# Patient Record
Sex: MALE | URBAN - METROPOLITAN AREA
[De-identification: names, ages, dates, MRNs, and addresses within clinical notes are randomized per-mention and may not be internally consistent; named-entity substitution may affect disease eponyms.]

---

## 2017-10-13 NOTE — TELEPHONE ENCOUNTER
APPT INFORMATION    Date & Time: 10/18/17, 11AM   Reason for Appt: MS   Referring Name/Clinic: self    Yes / No COMMENT / NOTES DATE & ACTION   Patient Contacted? No Records in epic.         RECORDS CLINIC NAME  (use N/A if no records ) DATE & ACTION RECEIVED RECS & IMG? Y/N   (may include other helpful notes)   External Clinics:            Internal Clinics: [facility]

## 2017-10-16 NOTE — PROGRESS NOTES
The patient 1st developed blurring of vision in his left eye. Then in 2011 the patient's vision got progressively worse. His visual symptoms were not painful.He was seen by ophthalmology.he was felt to have optic neuritis. Therefore, he underwent an MRI that demonstrated more than 8 lesions in both his brain and spine. LP reportedly demonstrated is consistent with a diagnosis of multiple sclerosis. He was given a diagnosis of MS 2011.he was started on Rebif which he was on until 2013. [employer] [place] of having to discontinue rebus  Because of flulike symptoms and new lesions on MRI. He was seen by Dr. Starks in July 2015. He had a MRI of the of the neuraxis after that visit.. E had 15 areas of increased T2 signal from his brain without any enhancement. His cervical MRI demonstrated to T2 lesions. He received 3 days of high-dose oral prednisone which he tolerated well. After this he was started on Copaxone.    Replete.  Monitor lytes

## 2017-10-18 ENCOUNTER — OFFICE VISIT (OUTPATIENT)
Dept: NEUROLOGY | Facility: CLINIC | Age: 37
End: 2017-10-18
Attending: PSYCHIATRY & NEUROLOGY
Payer: COMMERCIAL

## 2017-10-18 ENCOUNTER — PRE VISIT (OUTPATIENT)
Dept: NEUROLOGY | Facility: CLINIC | Age: 37
End: 2017-10-18

## 2017-10-18 VITALS — DIASTOLIC BLOOD PRESSURE: 84 MMHG | HEIGHT: 70 IN | SYSTOLIC BLOOD PRESSURE: 129 MMHG | HEART RATE: 99 BPM

## 2017-10-18 DIAGNOSIS — G35 MS (MULTIPLE SCLEROSIS) (H): Primary | ICD-10-CM

## 2017-10-18 LAB
ALBUMIN SERPL-MCNC: 4.2 G/DL (ref 3.4–5)
ALP SERPL-CCNC: 40 U/L (ref 40–150)
ALT SERPL W P-5'-P-CCNC: 23 U/L (ref 0–70)
ANION GAP SERPL CALCULATED.3IONS-SCNC: 8 MMOL/L (ref 3–14)
AST SERPL W P-5'-P-CCNC: 11 U/L (ref 0–45)
B BURGDOR IGG+IGM SER QL: 0.05 (ref 0–0.89)
BASOPHILS # BLD AUTO: 0.1 10E9/L (ref 0–0.2)
BASOPHILS NFR BLD AUTO: 0.8 %
BILIRUB SERPL-MCNC: 0.2 MG/DL (ref 0.2–1.3)
BUN SERPL-MCNC: 7 MG/DL (ref 7–30)
CALCIUM SERPL-MCNC: 8.6 MG/DL (ref 8.5–10.1)
CHLORIDE SERPL-SCNC: 106 MMOL/L (ref 94–109)
CO2 SERPL-SCNC: 25 MMOL/L (ref 20–32)
CREAT SERPL-MCNC: 0.7 MG/DL (ref 0.66–1.25)
DEPRECATED CALCIDIOL+CALCIFEROL SERPL-MC: 32 UG/L (ref 20–75)
DIFFERENTIAL METHOD BLD: NORMAL
EOSINOPHIL # BLD AUTO: 0.3 10E9/L (ref 0–0.7)
EOSINOPHIL NFR BLD AUTO: 3.9 %
ERYTHROCYTE [DISTWIDTH] IN BLOOD BY AUTOMATED COUNT: 12.5 % (ref 10–15)
FOLATE SERPL-MCNC: 33.6 NG/ML
GFR SERPL CREATININE-BSD FRML MDRD: >90 ML/MIN/1.7M2
GLUCOSE SERPL-MCNC: 112 MG/DL (ref 70–99)
HBV CORE AB SERPL QL IA: NONREACTIVE
HBV SURFACE AB SERPL IA-ACNC: 285.89 M[IU]/ML
HBV SURFACE AG SERPL QL IA: NONREACTIVE
HCT VFR BLD AUTO: 43.7 % (ref 40–53)
HGB BLD-MCNC: 14.8 G/DL (ref 13.3–17.7)
IMM GRANULOCYTES # BLD: 0 10E9/L (ref 0–0.4)
IMM GRANULOCYTES NFR BLD: 0.4 %
LYMPHOCYTES # BLD AUTO: 2.9 10E9/L (ref 0.8–5.3)
LYMPHOCYTES NFR BLD AUTO: 39.7 %
MCH RBC QN AUTO: 30 PG (ref 26.5–33)
MCHC RBC AUTO-ENTMCNC: 33.9 G/DL (ref 31.5–36.5)
MCV RBC AUTO: 89 FL (ref 78–100)
MONOCYTES # BLD AUTO: 0.6 10E9/L (ref 0–1.3)
MONOCYTES NFR BLD AUTO: 8.2 %
NEUTROPHILS # BLD AUTO: 3.4 10E9/L (ref 1.6–8.3)
NEUTROPHILS NFR BLD AUTO: 47 %
NRBC # BLD AUTO: 0 10*3/UL
NRBC BLD AUTO-RTO: 0 /100
PLATELET # BLD AUTO: 260 10E9/L (ref 150–450)
POTASSIUM SERPL-SCNC: 3.7 MMOL/L (ref 3.4–5.3)
PROT SERPL-MCNC: 7.7 G/DL (ref 6.8–8.8)
RBC # BLD AUTO: 4.94 10E12/L (ref 4.4–5.9)
SODIUM SERPL-SCNC: 139 MMOL/L (ref 133–144)
TSH SERPL DL<=0.005 MIU/L-ACNC: 0.51 MU/L (ref 0.4–4)
VIT B12 SERPL-MCNC: 517 PG/ML (ref 193–986)
WBC # BLD AUTO: 7.2 10E9/L (ref 4–11)

## 2017-10-18 PROCEDURE — 80053 COMPREHEN METABOLIC PANEL: CPT | Performed by: PSYCHIATRY & NEUROLOGY

## 2017-10-18 PROCEDURE — 86706 HEP B SURFACE ANTIBODY: CPT | Performed by: PSYCHIATRY & NEUROLOGY

## 2017-10-18 PROCEDURE — 82607 VITAMIN B-12: CPT | Performed by: PSYCHIATRY & NEUROLOGY

## 2017-10-18 PROCEDURE — 86704 HEP B CORE ANTIBODY TOTAL: CPT | Performed by: PSYCHIATRY & NEUROLOGY

## 2017-10-18 PROCEDURE — 84207 ASSAY OF VITAMIN B-6: CPT | Performed by: PSYCHIATRY & NEUROLOGY

## 2017-10-18 PROCEDURE — 86618 LYME DISEASE ANTIBODY: CPT | Performed by: PSYCHIATRY & NEUROLOGY

## 2017-10-18 PROCEDURE — 86787 VARICELLA-ZOSTER ANTIBODY: CPT | Performed by: PSYCHIATRY & NEUROLOGY

## 2017-10-18 PROCEDURE — 83516 IMMUNOASSAY NONANTIBODY: CPT | Performed by: PSYCHIATRY & NEUROLOGY

## 2017-10-18 PROCEDURE — 83876 ASSAY MYELOPEROXIDASE: CPT | Performed by: PSYCHIATRY & NEUROLOGY

## 2017-10-18 PROCEDURE — 36415 COLL VENOUS BLD VENIPUNCTURE: CPT | Performed by: PSYCHIATRY & NEUROLOGY

## 2017-10-18 PROCEDURE — 84443 ASSAY THYROID STIM HORMONE: CPT | Performed by: PSYCHIATRY & NEUROLOGY

## 2017-10-18 PROCEDURE — 82306 VITAMIN D 25 HYDROXY: CPT | Performed by: PSYCHIATRY & NEUROLOGY

## 2017-10-18 PROCEDURE — 99211 OFF/OP EST MAY X REQ PHY/QHP: CPT | Mod: ZF

## 2017-10-18 PROCEDURE — 87340 HEPATITIS B SURFACE AG IA: CPT | Performed by: PSYCHIATRY & NEUROLOGY

## 2017-10-18 PROCEDURE — 87522 HEPATITIS C REVRS TRNSCRPJ: CPT | Performed by: PSYCHIATRY & NEUROLOGY

## 2017-10-18 PROCEDURE — 40000975 ZZHCL STATISTIC JC VIR AB INDEX INHIB: Performed by: PSYCHIATRY & NEUROLOGY

## 2017-10-18 PROCEDURE — 86255 FLUORESCENT ANTIBODY SCREEN: CPT | Performed by: PSYCHIATRY & NEUROLOGY

## 2017-10-18 PROCEDURE — 85025 COMPLETE CBC W/AUTO DIFF WBC: CPT | Performed by: PSYCHIATRY & NEUROLOGY

## 2017-10-18 PROCEDURE — 86480 TB TEST CELL IMMUN MEASURE: CPT | Performed by: PSYCHIATRY & NEUROLOGY

## 2017-10-18 PROCEDURE — 86431 RHEUMATOID FACTOR QUANT: CPT | Performed by: PSYCHIATRY & NEUROLOGY

## 2017-10-18 PROCEDURE — 82746 ASSAY OF FOLIC ACID SERUM: CPT | Performed by: PSYCHIATRY & NEUROLOGY

## 2017-10-18 ASSESSMENT — PAIN SCALES - GENERAL: PAINLEVEL: NO PAIN (0)

## 2017-10-18 NOTE — MR AVS SNAPSHOT
After Visit Summary   10/18/2017    Mychal Stafford    MRN: [id number]           Patient Information     Date Of Birth          1980        Visit Information        Provider Department      10/18/2017 11:00 AM Montez Villafana MD [facility] Multiple Sclerosis        Today's Diagnoses     MS (multiple sclerosis) (H)    -  1      Care Instructions    Will get MRI brain and cervical spine    Will get blood work    Will follow up in 4 weeks     Please call or send a [employer] message with any questions or concerns.    You saw Dr Montez Villafana, today at the [facility] in [place].  In order to get a message to Dr. Villafana, please call [phone].  Please call if you have any of the following symptoms:    New or worsening neurologic symptoms that persist for 24-48 hours, such as:  o New onset of pain or marked worsening of pain  o Difficulty with speech, swallowing, or breathing  o New onset of vertigo or dizziness  o Change in bowel or bladder function (incontinence, difficulty urinating)    Increasing difficulty in self care    Marked changes in vision (double vision, blurred vision, graying of vision)    Change in mobility    Change in cognitive function    Falling    Worsening numbness, tingling or pain with a change in function    Worsening fatigue lasting more than 2 weeks  If you had labs completed today, we will contact you with the results.  If you are active in [employer], they will be released to you there.  Otherwise, your results will be provided to you via mail or telephone call.  Some results take up to 2 weeks for completion.  If you haven t heard anything about your lab results within 2 weeks, please call [phone] or send a [employer] message to obtain your results.  If you have an MRI scheduled prior to your next appointment with Dr. Villafana, he will provide you with the results at your scheduled follow up appointment.  If you are not scheduled to  see Dr. Villafana within 2 weeks after your MRI, please call or send a [employer] message to obtain your results.  Please be aware that it takes at least 5 business days after routine MRIs for your results to be reviewed by both the radiologist and Dr. Villafana.  Prescription refills should be faxed to us by your pharmacy.  Our fax number for prescription refills is [phone].  Please do your best to come to your appointments, and to arrive 10 minutes early to allow time for checking in.  [facility] reserves the right to terminate care of established patients if a patient misses three or more appointments in a clinic without providing notification within a 12-month period.    Developing Your Care Team  Individuals living with chronic illnesses like MS may be unaware that they are at risk for the same range of medical problems as everyone else.  This is why you must establish a relationship with other health care providers in addition to your Multiple Sclerosis doctor.  It can be difficult at times to figure out whether a health concern is related to your MS, or whether it is related to something else, such as hormonal changes, pseduoexacerbations, changes in your core body temperature, flu-like reactions to interferons, exercise, or infections.  Urinary tract infections are common culprits that can cause fatigue, weakness, or other  MS attack -like symptoms without classic symptoms of a UTI.  For this reason, if you call or come in to discuss symptoms, you may be asked to get in touch with your primary provider or another specialist, so that you receive the comprehensive care you need.  What is Multiple Sclerosis (MS)?  MS is a disease in which the nerve tissues in the brain and/or spinal cord are attacked by immune cells in the body.  These immune cells are present in everyone, and their normal role is to fight off infections.  In people with MS, these cells change the way they function and cross  into the nervous system.  Once there, they cause inflammation that damages the myelin (or the protective coating of a nerve cell, much like the plastic covering on an electrical cord) and parts of the nerve cell itself.  So far, a clear cause for this immune system dysfunction has not been found.  MS often starts out as the  relapsing-remitting  form.  This means there are episodes when you have symptoms, and other times when you recover to normal or near-normal.  Over time, if the damage to the nervous system continues, the disease can cause additional disability, such as difficulty walking.  If the relapses and nerve damage can be prevented with available medications, many patients with MS can go many years between relapses and have relatively little disability.  Remember: MS is a condition that changes and must be evaluated on an ongoing basis!  What is a Relapse? (Also called flare-ups, attacks, or exacerbations)  Relapses are due to the occurrence of inflammation in some part of the brain and spinal cord.  A relapse is new or recurrent symptoms which persist for at least 24 hours and sometimes worsen over 48 hours.  New symptoms need to be  by at least 1 month in order to be considered separate relapses. Most of the time, symptoms reach their maximal intensity within 2 weeks and then begin to slowly resolve.  At times, your symptoms may not recover fully for up to 6 months, depending on the severity of the episode.  The frequency of relapses is generally higher early in the disease, but can vary greatly among individuals with MS.  Improvement of symptoms for an individual is unpredictable with each relapse.    It is important to remember than an increase in symptoms and changes in function may not necessarily be a relapse.  There are other factors that contribute to such changes, such as hot weather, increased body temperature, infection, and stress.  The worsening of symptoms may look like a relapse  when in reality it is not.  These episodes are referred to as pseudorelapses.  Once the underlying cause is addressed, symptoms usually fade away and you feel better.  If you experience a worsening of symptoms that lasts more than 48 hours and does not improve with cooling down, decreasing stress, or treatment of an infection, please call us and we can help to better determine whether you are having a pseudorelapse versus a relapse.              Follow-ups after your visit        Follow-up notes from your care team     Return in about 4 weeks (around 11/15/2017).      Your next 10 appointments already scheduled     Oct 18, 2017 12:00 PM CDT   Lab with  LAB   [facility] ([facility])    [address] [place] [id number]   [phone]            Nov 21, 2017  4:45 PM CST   (Arrive by 4:30 PM)   MR BRAIN W/O & W CONTRAST with [address] MRI ([facility])    [address] [place] [id number] [phone]           Take your medicines as usual, unless your doctor tells you not to. Bring a list of your current medicines to your exam (including vitamins, minerals and over-the-counter drugs).  You will be given intravenous contrast for this exam. To prepare:   The day before your exam, drink extra fluids at least six 8-ounce glasses (unless your doctor tells you to restrict your fluids).   Have a blood test (creatinine test) within 30 days of your exam. Go to your clinic or Diagnostic Imaging Department for this test.  The MRI machine uses a strong magnet. Please wear clothes without metal (snaps, zippers). A sweatsuit works well, or we may give you a hospital gown.  Please remove any body piercings and hair extensions before you arrive. You will also remove watches, jewelry, hairpins, wallets, dentures, partial dental plates and hearing aids. You may wear contact lenses, and you may be able to wear  your rings. We have a safe place to keep your personal items, but it is safer to leave them at home.   **IMPORTANT** THE INSTRUCTIONS BELOW ARE ONLY FOR THOSE PATIENTS WHO HAVE BEEN TOLD THEY WILL RECEIVE SEDATION OR GENERAL ANESTHESIA DURING THEIR MRI PROCEDURE:  IF YOU WILL RECEIVE SEDATION (take medicine to help you relax during your exam):   You must get the medicine from your doctor before you arrive. Bring the medicine to the exam. Do not take it at home.   Arrive one hour early. Bring someone who can take you home after the test. Your medicine will make you sleepy. After the exam, you may not drive, take a bus or take a taxi by yourself.   No eating 8 hours before your exam. You may have clear liquids up until 4 hours before your exam. (Clear liquids include water, clear tea, black coffee and fruit juice without pulp.)  IF YOU WILL RECEIVE ANESTHESIA (be asleep for your exam):   Arrive 1 1/2 hours early. Bring someone who can take you home after the test. You may not drive, take a bus or take a taxi by yourself.   No eating 8 hours before your exam. You may have clear liquids up until 4 hours before your exam. (Clear liquids include water, clear tea, black coffee and fruit juice without pulp.)  Please call the Imaging Department at your exam site with any questions.            Nov 21, 2017  5:30 PM CST   (Arrive by 5:15 PM)   MR CERVICAL SPINE W/O & W CONTRAST with [address] [facility] ([facility])    [address] [id number] [phone]           Take your medicines as usual, unless your doctor tells you not to. Bring a list of your current medicines to your exam (including vitamins, minerals and over-the-counter drugs).  You will be given intravenous contrast for this exam. To prepare:   The day before your exam, drink extra fluids at least six 8-ounce glasses (unless your doctor tells you to restrict your fluids).   Have a blood  test (creatinine test) within 30 days of your exam. Go to your clinic or Diagnostic Imaging Department for this test.  The MRI machine uses a strong magnet. Please wear clothes without metal (snaps, zippers). A sweatsuit works well, or we may give you a hospital gown.  Please remove any body piercings and hair extensions before you arrive. You will also remove watches, jewelry, hairpins, wallets, dentures, partial dental plates and hearing aids. You may wear contact lenses, and you may be able to wear your rings. We have a safe place to keep your personal items, but it is safer to leave them at home.   **IMPORTANT** THE INSTRUCTIONS BELOW ARE ONLY FOR THOSE PATIENTS WHO HAVE BEEN TOLD THEY WILL RECEIVE SEDATION OR GENERAL ANESTHESIA DURING THEIR MRI PROCEDURE:  IF YOU WILL RECEIVE SEDATION (take medicine to help you relax during your exam):   You must get the medicine from your doctor before you arrive. Bring the medicine to the exam. Do not take it at home.   Arrive one hour early. Bring someone who can take you home after the test. Your medicine will make you sleepy. After the exam, you may not drive, take a bus or take a taxi by yourself.   No eating 8 hours before your exam. You may have clear liquids up until 4 hours before your exam. (Clear liquids include water, clear tea, black coffee and fruit juice without pulp.)  IF YOU WILL RECEIVE ANESTHESIA (be asleep for your exam):   Arrive 1 1/2 hours early. Bring someone who can take you home after the test. You may not drive, take a bus or take a taxi by yourself.   No eating 8 hours before your exam. You may have clear liquids up until 4 hours before your exam. (Clear liquids include water, clear tea, black coffee and fruit juice without pulp.)  Please call the Imaging Department at your exam site with any questions.            Nov 21, 2017  6:30 PM CST   (Arrive by 6:15 PM)   Return Visit with Montez Villafana MD   [facility] Multiple Sclerosis ([facility])    [address] [facility] [id number] [phone]              Future tests that were ordered for you today     Open Future Orders        Priority Expected Expires Ordered    MRI Brain w & w/o contrast Routine  10/18/2018 10/18/2017    MRI Cervical spine w & w/o contrast Routine  10/18/2018 10/18/2017            Who to contact     If you have questions or need follow up information about today's clinic visit or your schedule please contact [facility] directly at [phone].  Normal or non-critical lab and imaging results will be communicated to you by [employer], letter or phone within 4 business days after the clinic has received the results. If you do not hear from us within 7 days, please contact the clinic through [employer] or phone. If you have a critical or abnormal lab result, we will notify you by phone as soon as possible.  Submit refill requests through [employer] or call your pharmacy and they will forward the refill request to us. Please allow 3 business days for your refill to be completed.          Additional Information About Your Visit        [employer] Information     [employer] lets you send messages to your doctor, view your test results, renew your prescriptions, schedule appointments and more. To sign up, go to [email or web address] . Click on \"Log in\" on the left side of the screen, which will take you to the Welcome page. Then click on \"Sign up Now\" on the right side of the page.     You will be asked to enter the access code listed below, as well as some personal information. Please follow the directions to create your username and password.     Your access code is: [email or web address]  Expires: 2018  6:31 AM     Your access code will  in 90 days. If you need help or a new code, please call your [place] clinic or [phone].        Care EveryWhere ID     This is your Care EveryWhere ID. This could be used by other " "organizations to access your [place] medical records  [id number]        Your Vitals Were     Pulse Height                99 1.778 m (5' 10\")           Blood Pressure from Last 3 Encounters:   10/18/17 129/84   08/17/15 121/76   08/03/15 (!) 137/94    Weight from Last 3 Encounters:   08/17/15 73.7 kg (162 lb 6.4 oz)   08/03/15 74.4 kg (164 lb)   07/06/15 73 kg (161 lb)              We Performed the Following     CBC with platelets differential     Comprehensive metabolic panel     Folate     Hepatitis B core antibody     Hepatitis B Surface Antibody     Hepatitis B surface antigen     Hepatitis C RNA quantitative     TELMA Virus Ab Index Reflex Inhibition     Lyme Disease Kristine with reflex to WB Serum     M Tuberculosis by Quantiferon     Neuromyelitis Optica AQP4 IgG w Reflex Blood     Rheumatoid factor     TSH with free T4 reflex     Varicella Zoster Virus Antibody IgG     Vasculitis panel     Vitamin B12     Vitamin B6     Vitamin D Deficiency          Today's Medication Changes          These changes are accurate as of: 10/18/17 11:40 AM.  If you have any questions, ask your nurse or doctor.               These medicines have changed or have updated prescriptions.        Dose/Directions    gabapentin 300 MG capsule   Commonly known as:  NEURONTIN   This may have changed:  how much to take   Used for:  Demyelinating disease (H)        Dose:  300 mg   Take 1 capsule (300 mg) by mouth 3 times daily   Quantity:  90 capsule   Refills:  6                Primary Care Provider Office Phone # Fax #    eRji Galindo [phone]       [address] [address]        Equal Access to Services     NGOZI ROBB AH: Camilo Hooper, wajulianada lujeannie, qaybta kaalmada carlos pérez. So [facility] [phone].    ATENCIÓN: Si habla español, tiene a chris disposición servicios gratuitos de asistencia lingüística. Llame al " [phone].    We comply with applicable federal civil rights laws and [place] laws. We do not discriminate on the basis of race, color, national origin, age, disability, sex, sexual orientation, or gender identity.            Thank you!     Thank you for choosing [facility]  for your care. Our goal is always to provide you with excellent care. Hearing back from our patients is one way we can continue to improve our services. Please take a few minutes to complete the written survey that you may receive in the mail after your visit with us. Thank you!             Your Updated Medication List - Protect others around you: Learn how to safely use, store and throw away your medicines at www.disposemymeds.org.          This list is accurate as of: 10/18/17 11:40 AM.  Always use your most recent med list.                   Brand Name Dispense Instructions for use Diagnosis    ADVIL PO      Take by mouth as needed for moderate pain        ALEVE PO      Take by mouth as needed for moderate pain        DETROL PO      Take 5 mg by mouth 3 times daily        EFFEXOR PO      Take 150 mg by mouth daily        gabapentin 300 MG capsule    NEURONTIN    90 capsule    Take 1 capsule (300 mg) by mouth 3 times daily    Demyelinating disease (H)       VIAGRA PO      Take 100 mg by mouth as needed        Vitamin D3 3000 UNITS Tabs

## 2017-10-18 NOTE — PROGRESS NOTES
THE [facility] MULTIPLE SCLEROSIS CLINIC  NEW PATIENT EVALUATION/CONSULTATION    Referral source:   Self        Also followed by:   Reji Galindo  [facility] [address] [facility] [address]      PRINCIPAL NEUROLOGIC DIAGNOSIS: Multiple Sclerosis    DISEASE SUMMARY  Date of onset: 2011  Date of diagnosis of MS: 2011  Disease course at onset: Relapsing Remitting  Current disease course: Relapsing Remitting  Previous disease therapies:   Copaxone  Rebif  Current disease therapy: None  Most recent MRI brain: 7/11/2015  Most recent MRI cervical spine: 7/11/2015  CSF: 2011 positive  JCV serology result and date: NA      HISTORY OF ILLNESS:    An opinion on this year old right handed genetic male  was requested by by the patient for evaluation and managemetn. The patient was accompanied by his father. Previous records (physician notes, laboratory reports, and radiology reports) and imaging studies were reviewed and summarized. My recommendations will be communicated back to the patient's physician(s) by mail.  Follow-up is expected to be with me.      The patient 1st developed blurring of vision in his left eye. Then in 2011 the patient's vision got progressively worse. His visual symptoms were not painful.He was seen by ophthalmology.he was felt to have optic neuritis. Therefore, he underwent an MRI that demonstrated more than 8 lesions in both his brain and spine. LP reportedly demonstrated is consistent with a diagnosis of multiple sclerosis. He was given a diagnosis of MS 2011.he was started on Rebif which he was on until 2013. Because of flulike symptoms and new lesions on MRI. He was seen by Dr. Starks in July 2015. He had a MRI of the of the neuraxis after that visit.. E had 15 areas of increased T2 signal from his brain without any enhancement. His cervical MRI demonstrated to T2 lesions. He received 3 days of high-dose oral prednisone which he tolerated well. After  this he was started on Copaxone.     He reports that he has been issues with his neck. The pain in his neck started three weeks ago. He woke up one day, where he had a soreness in his neck. He thought he had just slept funny. The pain was made worse over the day.The pain had a constant pressure type aching pain in the back of neck. He went to his PCP who gave him flexeril, which did not control pain. The pain start improve about a week ago. The patient has not had much pain since Friday. Besides the neck pain he had been doing well overall.    Current Symptoms:  1.) Vision: He has blurry vision in his left.  When he was overheated, his vision in left will completely go out. This has been stable since 2011. Of note this past visit to the ophthalmologist he was able to correct the eye 20/20    2.)Leg pain: The pain is a burning tingling sensation. The sensation of touch is reduced in the leg during the sensation. IT can occurs in both legs, but it is generally worse in the left leg. The sensation comes and goes. It is worse in the heat.     3.) Urinary symptoms: He has urgency. He used have urinary incontience, but it has been controlled since being the Deterol      PAST HISTORY:  Past Medical History:   Diagnosis Date     Asthma      Depression      MS (multiple sclerosis) (H)        Past Surgical History:   Procedure Laterality Date     ENT SURGERY       HC TOOTH EXTRACTION W/FORCEP                Current Outpatient Prescriptions:  Current Outpatient Prescriptions   Medication     Sildenafil Citrate (VIAGRA PO)     Cholecalciferol (VITAMIN D3) 3000 UNITS TABS     Venlafaxine HCl (EFFEXOR PO)     Tolterodine Tartrate (DETROL PO)     Ibuprofen (ADVIL PO)     Naproxen Sodium (ALEVE PO)     gabapentin (NEURONTIN) 300 MG capsule     No current facility-administered medications for this visit.           ALLERGIES       Allergies   Allergen Reactions     Ceclor [Cefaclor] Hives     Hives and rash         Social  History    Social History     Social History     Marital status: Single     Spouse name: N/A     Number of children: N/A     Years of education: N/A     Occupational History     Not on file.     Social History Main Topics     Smoking status: Never Smoker     Smokeless tobacco: Current User     Types: Chew     Alcohol use Yes      Comment: Ocs     Drug use: Not on file     Sexual activity: Not on file     Other Topics Concern     Not on file     Social History Narrative         FAMILY HISTORY     Family History   Problem Relation Age of Onset     Asthma Other      Depression Other      Alzheimer Disease Other      Hypertension Other      CANCER No family hx of      DIABETES No family hx of      Glaucoma No family hx of      Macular Degeneration No family hx of      CEREBROVASCULAR DISEASE No family hx of      Thyroid Disease No family hx of      Multiple Sclerosis No family hx of          REVIEW OF SYSTEMS:    Comprehensive review of systems otherwise was negative, including constitutional, head and neck, cardiovascular, pulmonary, gastrointestinal, endocrine, urologic, reproductive, rheumatic, hematologic, immunologic, dermatologic, and psychiatric.    Nutritional concerns: None  Driving issues: None   Safety concerns regarding living situations and safety at home: None  Risk of falls: None  Pain: None    PHYSICAL EXAM:    Hair, skin, nails, and joints were normal. Neck was supple without Lhermitte's phenomenon.  There was no percussion tenderness over the spine.     The patient was alert and oriented to person, place, and time with normal language, attention and concentration, recent and remote memory, praxis, and intellectual function. Affect was normal. The patient did not appear depressed.    Visual acuity:  OD 20/25-2  OS 20/30-1    Correction: with glasses    Visual fields were full to confrontation.   Pupils were 4 mm and briskly reactive OU with a left relative afferent pupillary defect.  Funduscopic  examination was normal without disc edema, erythema, or atrophy.  Extraocular movements: Intact without LORAINE  Facial sensation is normal. Normal strength of the muscles of mastication:   Muscles of facial expression were normal  Hearing was normal. Gag reflex and palatal movements were normal. Sternocleidomastoid and trapezius power were normal. Tongue movements were normal. There was no dysarthria.    Motor Examination:   There was no pronator drift.       Motor    Upper      Right Left   Shoulder Abduction 5 5   Elbow Flexion 5 5   Elbow Extension 5 5   Wrist Extension 5 5   Digit Extension 5 5   Digit Flexion 5 5   APB 5 5   Tone 0 0   Lower       Right Left   Hip Flexion 5 5   Knee Extension 5 5   Knee Flexion 5 5   Foot Dorsiflexion 5 5   Foot Plantar Flexion 5 5   EH 5 5   Toe Flexion 5 5   Tone 0 0           Grade Description   0 No increase in muscle tone   1 Slight increase in muscle tone, manifested by a catch and release or by minimal resistance at the end of the range of motion when the affected part(s) is moved in flexion or extension   1+ Slight increase in muscle tone, manifested by a catch, followed by minimal resistance throughout the remainder (less than half) of the ROM   2 More marked increase in muscle tone through most of the ROM, but affected part(s) easily moved   3 Considerable increase in muscle tone, passive movement difficult   4 Affected part(s) rigid in flexion or extension             Reflexes:     Reflexes       Right  Left   Biceps 3  2   Triceps 3  2   Brachioradialis 2  1   Patellar  3  3   Achilles 3  3   Babinski down  down         Coordination:     Right Left   RRM 1 Abnormal 0 Normal   DANIELA 1 Abnormal 0 Normal   FTN 1 Abnormal 0 Normal   RRM 1 Abnormal 0 Normal   HKS 1 Abnormal 0 Normal         Sensory examination:    Light touch:  Asymmetrical impaired , Pin Prick:  Asymmetrical impaired , Vibration:   Asymmetrical impaired , Prioperception:  Intact in all extremities and  Temperature:  Asymmetrical impaired   Temperature increased in left arm. Touch, pinprick, and vibration diminished by 50$ in the left leg    Coordination and Gait  Gait 0 Normal   Right Left   Romberg 0 Normal  Heel 0 Normal 0 Normal   Tandem 1 Abnormal  Toe 0 Normal 0 Normal         REVIEW OF OUTSIDE RECORDS:    Summarized above    REVIEW OF IMAGING STUDIES:    I personally reviewed the following images:    7/11/2015 MRI brain Moderate to severe T2 burden with an enhancing lesions in left mid brain    7/11/2015 MRI spinal cord. Significant T2 burden without enhancment.  ASSESSMENT:    She is a 36-year-old gentleman s past medical issue multiple sclerosis who is presenting to [facility]. After reviewing his imaging, CSF analysis, his history, and exam, the diagnosis multiple sclerosis is secured. The patient s most recent episode could be consistent with the diagnosis of MS,, given its tempo. However the description of the symptoms is atypical for MS relapse. If this was a relapse into most likely localize the cervical cord. Given that the symptoms resolved, there is no need for acute treatment. The patient needs to be on a long-term disease modifying therapy. I will check bloodwork to determine what medication to be a candidate for. I will also get a MRI of the brain and cervical cord to establish a new baseline for treatment. I will follow the patient up in one month    PLAN:    Check CMP, CBC +diff, VZV IgG, JCV+index, remote hepatitis panel, TB quant, Vitamin D, Vitamin b12, folate, TSH, and copper   Get MRI of the cervical spine and brain w/wo contrast.     Finally I will follow the patient up in 1 months as long as the patient is doing well. I instructed the patient to call or mychart my office with any concerns or questions.    I spent 60 minutes in this visit, with >50% direct patient time spent counseling about prognosis, treatment options, and coordination of care.    Montez Villafana MD [facility]  AUDREYS  Staff Neurologist   10/18/17

## 2017-10-18 NOTE — LETTER
10/18/2017       RE: Mychal Stafford  [address] [facility] [address]     Dear Colleague,    Thank you for referring your patient, Mychal Stafford, to the [facility] MULTIPLE SCLEROSIS at [facility]. Please see a copy of my visit note below.        THE [facility] MULTIPLE SCLEROSIS CLINIC  NEW PATIENT EVALUATION/CONSULTATION    Referral source:   Self        Also followed by:   Reji Orozcoaline  [facility] [address] [facility] [address]      PRINCIPAL NEUROLOGIC DIAGNOSIS: Multiple Sclerosis    DISEASE SUMMARY  Date of onset: 2011  Date of diagnosis of MS: 2011  Disease course at onset: Relapsing Remitting  Current disease course: Relapsing Remitting  Previous disease therapies:   Copaxone  Rebif  Current disease therapy: None  Most recent MRI brain: 7/11/2015  Most recent MRI cervical spine: 7/11/2015  CSF: 2011 positive  JCV serology result and date: NA      HISTORY OF ILLNESS:    An opinion on this year old right handed genetic male  was requested by by the patient for evaluation and managemetn. The patient was accompanied by his father. Previous records (physician notes, laboratory reports, and radiology reports) and imaging studies were reviewed and summarized. My recommendations will be communicated back to the patient's physician(s) by mail.  Follow-up is expected to be with me.      The patient 1st developed blurring of vision in his left eye. Then in 2011 the patient's vision got progressively worse. His visual symptoms were not painful.He was seen by ophthalmology.he was felt to have optic neuritis. Therefore, he underwent an MRI that demonstrated more than 8 lesions in both his brain and spine. LP reportedly demonstrated is consistent with a diagnosis of multiple sclerosis. He was given a diagnosis of MS 2011.he was started on Rebif which he was on until 2013. Because of flulike symptoms and new lesions on MRI. He was seen by Dr. Starks in  July 2015. He had a MRI of the of the neuraxis after that visit.. E had 15 areas of increased T2 signal from his brain without any enhancement. His cervical MRI demonstrated to T2 lesions. He received 3 days of high-dose oral prednisone which he tolerated well. After this he was started on Copaxone.     He reports that he has been issues with his neck. The pain in his neck started three weeks ago. He woke up one day, where he had a soreness in his neck. He thought he had just slept funny. The pain was made worse over the day.The pain had a constant pressure type aching pain in the back of neck. He went to his PCP who gave him flexeril, which did not control pain. The pain start improve about a week ago. The patient has not had much pain since Friday. Besides the neck pain he had been doing well overall.    Current Symptoms:  1.) Vision: He has blurry vision in his left.  When he was overheated, his vision in left will completely go out. This has been stable since 2011. Of note this past visit to the ophthalmologist he was able to correct the eye 20/20    2.)Leg pain: The pain is a burning tingling sensation. The sensation of touch is reduced in the leg during the sensation. IT can occurs in both legs, but it is generally worse in the left leg. The sensation comes and goes. It is worse in the heat.     3.) Urinary symptoms: He has urgency. He used have urinary incontience, but it has been controlled since being the Deterol      PAST HISTORY:  Past Medical History:   Diagnosis Date     Asthma      Depression      MS (multiple sclerosis) (H)        Past Surgical History:   Procedure Laterality Date     ENT SURGERY       HC TOOTH EXTRACTION W/FORCEP                Current Outpatient Prescriptions:  Current Outpatient Prescriptions   Medication     Sildenafil Citrate (VIAGRA PO)     Cholecalciferol (VITAMIN D3) 3000 UNITS TABS     Venlafaxine HCl (EFFEXOR PO)     Tolterodine Tartrate (DETROL PO)     Ibuprofen (ADVIL PO)      Naproxen Sodium (ALEVE PO)     gabapentin (NEURONTIN) 300 MG capsule     No current facility-administered medications for this visit.           ALLERGIES       Allergies   Allergen Reactions     Ceclor [Cefaclor] Hives     Hives and rash         Social History    Social History     Social History     Marital status: Single     Spouse name: N/A     Number of children: N/A     Years of education: N/A     Occupational History     Not on file.     Social History Main Topics     Smoking status: Never Smoker     Smokeless tobacco: Current User     Types: Chew     Alcohol use Yes      Comment: Ocs     Drug use: Not on file     Sexual activity: Not on file     Other Topics Concern     Not on file     Social History Narrative         FAMILY HISTORY     Family History   Problem Relation Age of Onset     Asthma Other      Depression Other      Alzheimer Disease Other      Hypertension Other      CANCER No family hx of      DIABETES No family hx of      Glaucoma No family hx of      Macular Degeneration No family hx of      CEREBROVASCULAR DISEASE No family hx of      Thyroid Disease No family hx of      Multiple Sclerosis No family hx of          REVIEW OF SYSTEMS:    Comprehensive review of systems otherwise was negative, including constitutional, head and neck, cardiovascular, pulmonary, gastrointestinal, endocrine, urologic, reproductive, rheumatic, hematologic, immunologic, dermatologic, and psychiatric.    Nutritional concerns: None  Driving issues: None   Safety concerns regarding living situations and safety at home: None  Risk of falls: None  Pain: None    PHYSICAL EXAM:    Hair, skin, nails, and joints were normal. Neck was supple without Lhermitte's phenomenon.  There was no percussion tenderness over the spine.     The patient was alert and oriented to person, place, and time with normal language, attention and concentration, recent and remote memory, praxis, and intellectual function. Affect was normal. The  patient did not appear depressed.    Visual acuity:  OD 20/25-2  OS 20/30-1    Correction: with glasses    Visual fields were full to confrontation.   Pupils were 4 mm and briskly reactive OU with a left relative afferent pupillary defect.  Funduscopic examination was normal without disc edema, erythema, or atrophy.  Extraocular movements: Intact without LORAINE  Facial sensation is normal. Normal strength of the muscles of mastication:   Muscles of facial expression were normal  Hearing was normal. Gag reflex and palatal movements were normal. Sternocleidomastoid and trapezius power were normal. Tongue movements were normal. There was no dysarthria.    Motor Examination:   There was no pronator drift.       Motor    Upper      Right Left   Shoulder Abduction 5 5   Elbow Flexion 5 5   Elbow Extension 5 5   Wrist Extension 5 5   Digit Extension 5 5   Digit Flexion 5 5   APB 5 5   Tone 0 0   Lower       Right Left   Hip Flexion 5 5   Knee Extension 5 5   Knee Flexion 5 5   Foot Dorsiflexion 5 5   Foot Plantar Flexion 5 5   EH 5 5   Toe Flexion 5 5   Tone 0 0           Grade Description   0 No increase in muscle tone   1 Slight increase in muscle tone, manifested by a catch and release or by minimal resistance at the end of the range of motion when the affected part(s) is moved in flexion or extension   1+ Slight increase in muscle tone, manifested by a catch, followed by minimal resistance throughout the remainder (less than half) of the ROM   2 More marked increase in muscle tone through most of the ROM, but affected part(s) easily moved   3 Considerable increase in muscle tone, passive movement difficult   4 Affected part(s) rigid in flexion or extension             Reflexes:     Reflexes       Right  Left   Biceps 3  2   Triceps 3  2   Brachioradialis 2  1   Patellar  3  3   Achilles 3  3   Babinski down  down         Coordination:     Right Left   RRM 1 Abnormal 0 Normal   DANIELA 1 Abnormal 0 Normal   FTN 1 Abnormal 0  Normal   RRM 1 Abnormal 0 Normal   HKS 1 Abnormal 0 Normal         Sensory examination:    Light touch:  Asymmetrical impaired , Pin Prick:  Asymmetrical impaired , Vibration:   Asymmetrical impaired , Prioperception:  Intact in all extremities and Temperature:  Asymmetrical impaired   Temperature increased in left arm. Touch, pinprick, and vibration diminished by 50$ in the left leg    Coordination and Gait  Gait 0 Normal   Right Left   Romberg 0 Normal  Heel 0 Normal 0 Normal   Tandem 1 Abnormal  Toe 0 Normal 0 Normal         REVIEW OF OUTSIDE RECORDS:    Summarized above    REVIEW OF IMAGING STUDIES:    I personally reviewed the following images:    7/11/2015 MRI brain Moderate to severe T2 burden with an enhancing lesions in left mid brain    7/11/2015 MRI spinal cord. Significant T2 burden without enhancment.  ASSESSMENT:    She is a 36-year-old gentleman s past medical issue multiple sclerosis who is presenting to [facility] care. After reviewing his imaging, CSF analysis, his history, and exam, the diagnosis multiple sclerosis is secured. The patient s most recent episode could be consistent with the diagnosis of MS,, given its tempo. However the description of the symptoms is atypical for MS relapse. If this was a relapse into most likely localize the cervical cord. Given that the symptoms resolved, there is no need for acute treatment. The patient needs to be on a long-term disease modifying therapy. I will check bloodwork to determine what medication to be a candidate for. I will also get a MRI of the brain and cervical cord to establish a new baseline for treatment. I will follow the patient up in one month    PLAN:    Check CMP, CBC +diff, VZV IgG, JCV+index, remote hepatitis panel, TB quant, Vitamin D, Vitamin b12, folate, TSH, and copper   Get MRI of the cervical spine and brain w/wo contrast.     Finally I will follow the patient up in 1 months as long as the patient is doing well. I instructed the  patient to call or mychart my office with any concerns or questions.    I spent 60 minutes in this visit, with >50% direct patient time spent counseling about prognosis, treatment options, and coordination of care.      Again, thank you for allowing me to participate in the care of your patient.      Sincerely,    Montez Villafana MD

## 2017-10-18 NOTE — PATIENT INSTRUCTIONS
Will get MRI brain and cervical spine    Will get blood work    Will follow up in 4 weeks     Please call or send a App[employer]wack message with any questions or concerns.    You saw Dr Montez Villafana, today at the [facility] in [place].  In order to get a message to Dr. Villafana, please call [phone].  Please call if you have any of the following symptoms:    New or worsening neurologic symptoms that persist for 24-48 hours, such as:  o New onset of pain or marked worsening of pain  o Difficulty with speech, swallowing, or breathing  o New onset of vertigo or dizziness  o Change in bowel or bladder function (incontinence, difficulty urinating)    Increasing difficulty in self care    Marked changes in vision (double vision, blurred vision, graying of vision)    Change in mobility    Change in cognitive function    Falling    Worsening numbness, tingling or pain with a change in function    Worsening fatigue lasting more than 2 weeks  If you had labs completed today, we will contact you with the results.  If you are active in [email or web address], they will be released to you there.  Otherwise, your results will be provided to you via mail or telephone call.  Some results take up to 2 weeks for completion.  If you haven t heard anything about your lab results within 2 weeks, please call [phone] or send a [email or web address] message to obtain your results.  If you have an MRI scheduled prior to your next appointment with Dr. Villafana, he will provide you with the results at your scheduled follow up appointment.  If you are not scheduled to see Dr. Villafana within 2 weeks after your MRI, please call or send a [email or web address] message to obtain your results.  Please be aware that it takes at least 5 business days after routine MRIs for your results to be reviewed by both the radiologist and Dr. Villafana.  Prescription refills should be faxed to us by your pharmacy.  Our fax number for prescription refills is  [phone].  Please do your best to come to your appointments, and to arrive 10 minutes early to allow time for checking in.  [facility] reserves the right to terminate care of established patients if a patient misses three or more appointments in a clinic without providing notification within a 12-month period.    Developing Your Care Team  Individuals living with chronic illnesses like MS may be unaware that they are at risk for the same range of medical problems as everyone else.  This is why you must establish a relationship with other health care providers in addition to your Multiple Sclerosis doctor.  It can be difficult at times to figure out whether a health concern is related to your MS, or whether it is related to something else, such as hormonal changes, pseduoexacerbations, changes in your core body temperature, flu-like reactions to interferons, exercise, or infections.  Urinary tract infections are common culprits that can cause fatigue, weakness, or other  MS attack -like symptoms without classic symptoms of a UTI.  For this reason, if you call or come in to discuss symptoms, you may be asked to get in touch with your primary provider or another specialist, so that you receive the comprehensive care you need.  What is Multiple Sclerosis (MS)?  MS is a disease in which the nerve tissues in the brain and/or spinal cord are attacked by immune cells in the body.  These immune cells are present in everyone, and their normal role is to fight off infections.  In people with MS, these cells change the way they function and cross into the nervous system.  Once there, they cause inflammation that damages the myelin (or the protective coating of a nerve cell, much like the plastic covering on an electrical cord) and parts of the nerve cell itself.  So far, a clear cause for this immune system dysfunction has not been found.  MS often starts out as the  relapsing-remitting  form.  This  means there are episodes when you have symptoms, and other times when you recover to normal or near-normal.  Over time, if the damage to the nervous system continues, the disease can cause additional disability, such as difficulty walking.  If the relapses and nerve damage can be prevented with available medications, many patients with MS can go many years between relapses and have relatively little disability.  Remember: MS is a condition that changes and must be evaluated on an ongoing basis!  What is a Relapse? (Also called flare-ups, attacks, or exacerbations)  Relapses are due to the occurrence of inflammation in some part of the brain and spinal cord.  A relapse is new or recurrent symptoms which persist for at least 24 hours and sometimes worsen over 48 hours.  New symptoms need to be  by at least 1 month in order to be considered separate relapses. Most of the time, symptoms reach their maximal intensity within 2 weeks and then begin to slowly resolve.  At times, your symptoms may not recover fully for up to 6 months, depending on the severity of the episode.  The frequency of relapses is generally higher early in the disease, but can vary greatly among individuals with MS.  Improvement of symptoms for an individual is unpredictable with each relapse.    It is important to remember than an increase in symptoms and changes in function may not necessarily be a relapse.  There are other factors that contribute to such changes, such as hot weather, increased body temperature, infection, and stress.  The worsening of symptoms may look like a relapse when in reality it is not.  These episodes are referred to as pseudorelapses.  Once the underlying cause is addressed, symptoms usually fade away and you feel better.  If you experience a worsening of symptoms that lasts more than 48 hours and does not improve with cooling down, decreasing stress, or treatment of an infection, please call us and we can help  to better determine whether you are having a pseudorelapse versus a relapse.

## 2017-10-18 NOTE — NURSING NOTE
"Chief Complaint   Patient presents with     Consult     Multiple Sclerosis       Initial /84  Pulse 99  Ht 1.778 m (5' 10\") Estimated body mass index is 23.3 kg/(m^2) as calculated from the following:    Height as of 8/17/15: 1.778 m (5' 10\").    Weight as of 8/17/15: 73.7 kg (162 lb 6.4 oz).  Medication Reconciliation: complete.  Micaela Garcia, BRITTNEY    "

## 2017-10-20 LAB
HCV RNA SERPL NAA+PROBE-ACNC: NORMAL [IU]/ML
HCV RNA SERPL NAA+PROBE-LOG IU: NORMAL LOG IU/ML
M TB TUBERC IFN-G BLD QL: NEGATIVE
M TB TUBERC IFN-G/MITOGEN IGNF BLD: 0 IU/ML
MYELOPEROXIDASE AB SER-ACNC: <0.2 AI (ref 0–0.9)
PROTEINASE3 IGG SER-ACNC: <0.2 AI (ref 0–0.9)
RHEUMATOID FACT SER NEPH-ACNC: <20 IU/ML (ref 0–20)
VZV IGG SER QL IA: 6.5 AI (ref 0–0.8)

## 2017-10-21 LAB — VIT B6 SERPL-MCNC: 79.2 NMOL/L (ref 20–125)

## 2017-10-23 LAB — LAB SCANNED RESULT: ABNORMAL

## 2017-10-25 LAB — AQP4 H2O CHANNEL IGG TITR SERPL IF: NORMAL {TITER}

## 2017-11-21 ENCOUNTER — OFFICE VISIT (OUTPATIENT)
Dept: NEUROLOGY | Facility: CLINIC | Age: 37
End: 2017-11-21
Attending: PSYCHIATRY & NEUROLOGY
Payer: COMMERCIAL

## 2017-11-21 ENCOUNTER — MEDICAL CORRESPONDENCE (OUTPATIENT)
Dept: HEALTH INFORMATION MANAGEMENT | Facility: CLINIC | Age: 37
End: 2017-11-21

## 2017-11-21 VITALS
BODY MASS INDEX: 25.03 KG/M2 | OXYGEN SATURATION: 96 % | DIASTOLIC BLOOD PRESSURE: 84 MMHG | HEIGHT: 70 IN | WEIGHT: 174.8 LBS | RESPIRATION RATE: 20 BRPM | HEART RATE: 79 BPM | SYSTOLIC BLOOD PRESSURE: 135 MMHG | TEMPERATURE: 98.1 F

## 2017-11-21 DIAGNOSIS — G35 MS (MULTIPLE SCLEROSIS) (H): Primary | ICD-10-CM

## 2017-11-21 PROCEDURE — 99214 OFFICE O/P EST MOD 30 MIN: CPT | Mod: ZF

## 2017-11-21 ASSESSMENT — PAIN SCALES - GENERAL: PAINLEVEL: NO PAIN (0)

## 2017-11-21 NOTE — MR AVS SNAPSHOT
After Visit Summary   11/21/2017    Mychal Stafford    MRN: [id number]           Patient Information     Date Of Birth          1980        Visit Information        Provider Department      11/21/2017 6:30 PM Montez Villafana MD [facility]        Today's Diagnoses     MS (multiple sclerosis) (H)    -  1      Care Instructions    Will start [employer]    Will follow up in 3 months    You saw Dr Montez Villafana, today at the [facility] in [place].  In order to get a message to Dr. Villafana, please call [phone].  Please call if you have any of the following symptoms:    New or worsening neurologic symptoms that persist for 24-48 hours, such as:  o New onset of pain or marked worsening of pain  o Difficulty with speech, swallowing, or breathing  o New onset of vertigo or dizziness  o Change in bowel or bladder function (incontinence, difficulty urinating)    Increasing difficulty in self care    Marked changes in vision (double vision, blurred vision, graying of vision)    Change in mobility    Change in cognitive function    Falling    Worsening numbness, tingling or pain with a change in function    Worsening fatigue lasting more than 2 weeks  If you had labs completed today, we will contact you with the results.  If you are active in [employer], they will be released to you there.  Otherwise, your results will be provided to you via mail or telephone call.  Some results take up to 2 weeks for completion.  If you haven t heard anything about your lab results within 2 weeks, please call [phone] or send a [employer] message to obtain your results.  If you have an MRI scheduled prior to your next appointment with Dr. Villafana, he will provide you with the results at your scheduled follow up appointment.  If you are not scheduled to see Dr. Villafana within 2 weeks after your MRI, please call or send a [employer] message to obtain your results.  Please  be aware that it takes at least 5 business days after routine MRIs for your results to be reviewed by both the radiologist and Dr. Villafana.  Prescription refills should be faxed to us by your pharmacy.  Our fax number for prescription refills is [phone].  Please do your best to come to your appointments, and to arrive 10 minutes early to allow time for checking in.  [facility] reserves the right to terminate care of established patients if a patient misses three or more appointments in a clinic without providing notification within a 12-month period.    Developing Your Care Team  Individuals living with chronic illnesses like MS may be unaware that they are at risk for the same range of medical problems as everyone else.  This is why you must establish a relationship with other health care providers in addition to your Multiple Sclerosis doctor.  It can be difficult at times to figure out whether a health concern is related to your MS, or whether it is related to something else, such as hormonal changes, pseduoexacerbations, changes in your core body temperature, flu-like reactions to interferons, exercise, or infections.  Urinary tract infections are common culprits that can cause fatigue, weakness, or other  MS attack -like symptoms without classic symptoms of a UTI.  For this reason, if you call or come in to discuss symptoms, you may be asked to get in touch with your primary provider or another specialist, so that you receive the comprehensive care you need.  What is Multiple Sclerosis (MS)?  MS is a disease in which the nerve tissues in the brain and/or spinal cord are attacked by immune cells in the body.  These immune cells are present in everyone, and their normal role is to fight off infections.  In people with MS, these cells change the way they function and cross into the nervous system.  Once there, they cause inflammation that damages the myelin (or the protective coating of  a nerve cell, much like the plastic covering on an electrical cord) and parts of the nerve cell itself.  So far, a clear cause for this immune system dysfunction has not been found.  MS often starts out as the  relapsing-remitting  form.  This means there are episodes when you have symptoms, and other times when you recover to normal or near-normal.  Over time, if the damage to the nervous system continues, the disease can cause additional disability, such as difficulty walking.  If the relapses and nerve damage can be prevented with available medications, many patients with MS can go many years between relapses and have relatively little disability.  Remember: MS is a condition that changes and must be evaluated on an ongoing basis!  What is a Relapse? (Also called flare-ups, attacks, or exacerbations)  Relapses are due to the occurrence of inflammation in some part of the brain and spinal cord.  A relapse is new or recurrent symptoms which persist for at least 24 hours and sometimes worsen over 48 hours.  New symptoms need to be  by at least 1 month in order to be considered separate relapses. Most of the time, symptoms reach their maximal intensity within 2 weeks and then begin to slowly resolve.  At times, your symptoms may not recover fully for up to 6 months, depending on the severity of the episode.  The frequency of relapses is generally higher early in the disease, but can vary greatly among individuals with MS.  Improvement of symptoms for an individual is unpredictable with each relapse.    It is important to remember than an increase in symptoms and changes in function may not necessarily be a relapse.  There are other factors that contribute to such changes, such as hot weather, increased body temperature, infection, and stress.  The worsening of symptoms may look like a relapse when in reality it is not.  These episodes are referred to as pseudorelapses.  Once the underlying cause is  "addressed, symptoms usually fade away and you feel better.  If you experience a worsening of symptoms that lasts more than 48 hours and does not improve with cooling down, decreasing stress, or treatment of an infection, please call us and we can help to better determine whether you are having a pseudorelapse versus a relapse.              Follow-ups after your visit        Follow-up notes from your care team     Return in about 4 months (around 3/21/2018).      Who to contact     If you have questions or need follow up information about today's clinic visit or your schedule please contact [facility] directly at [phone].  Normal or non-critical lab and imaging results will be communicated to you by hetrashart, letter or phone within 4 business days after the clinic has received the results. If you do not hear from us within 7 days, please contact the clinic through [employer] or phone. If you have a critical or abnormal lab result, we will notify you by phone as soon as possible.  Submit refill requests through [employer] or call your pharmacy and they will forward the refill request to us. Please allow 3 business days for your refill to be completed.          Additional Information About Your Visit        [employer] Information     Engage lets you send messages to your doctor, view your test results, renew your prescriptions, schedule appointments and more. To sign up, go to [email or web address] . Click on \"Log in\" on the left side of the screen, which will take you to the Welcome page. Then click on \"Sign up Now\" on the right side of the page.     You will be asked to enter the access code listed below, as well as some personal information. Please follow the directions to create your username and password.     Your access code is: [email or web address]  Expires: 2018  5:31 AM     Your access code will  in 90 days. If you need help or a new code, please call your [facility] clinic or [phone].      " "  Care EveryWhere ID     This is your Care EveryWhere ID. This could be used by other organizations to access your [place] medical records  [id number]        Your Vitals Were     Pulse Temperature Respirations Height Pulse Oximetry BMI (Body Mass Index)    79 98.1  F (36.7  C) (Oral) 20 1.778 m (5' 10\") 96% 25.08 kg/m2       Blood Pressure from Last 3 Encounters:   11/21/17 135/84   10/18/17 129/84   08/17/15 121/76    Weight from Last 3 Encounters:   11/21/17 79.3 kg (174 lb 12.8 oz)   08/17/15 73.7 kg (162 lb 6.4 oz)   08/03/15 74.4 kg (164 lb)              Today, you had the following     No orders found for display         Today's Medication Changes          These changes are accurate as of: 11/21/17  7:01 PM.  If you have any questions, ask your nurse or doctor.               These medicines have changed or have updated prescriptions.        Dose/Directions    gabapentin 300 MG capsule   Commonly known as:  NEURONTIN   This may have changed:  how much to take   Used for:  Demyelinating disease (H)        Dose:  300 mg   Take 1 capsule (300 mg) by mouth 3 times daily   Quantity:  90 capsule   Refills:  6                Primary Care Provider Office Phone # Fax #    Reji Galindo [phone]       [address] [address]        Equal Access to Services     NGOZI ROBB AH: Hadii arpit pérez Somaria m, waaxda luqadaha, qaybta kaalmada carlos pérez. So [facility] [phone].    ATENCIÓN: Si habla español, tiene a chris disposición servicios gratuitos de asistencia lingüística. Llame al [phone].    We comply with applicable federal civil rights laws and [place] laws. We do not discriminate on the basis of race, color, national origin, age, disability, sex, sexual orientation, or gender identity.            Thank you!     Thank you for choosing [facility]  for your care. Our goal is always to provide " you with excellent care. Hearing back from our patients is one way we can continue to improve our services. Please take a few minutes to complete the written survey that you may receive in the mail after your visit with us. Thank you!             Your Updated Medication List - Protect others around you: Learn how to safely use, store and throw away your medicines at www.disposemymeds.org.          This list is accurate as of: 11/21/17  7:01 PM.  Always use your most recent med list.                   Brand Name Dispense Instructions for use Diagnosis    ADVIL PO      Take 400-600 mg by mouth every 8 hours as needed for moderate pain        ALEVE PO      Take 220 mg by mouth as needed for moderate pain        DETROL PO      Take 5 mg by mouth 3 times daily        EFFEXOR PO      Take 150 mg by mouth daily        gabapentin 300 MG capsule    NEURONTIN    90 capsule    Take 1 capsule (300 mg) by mouth 3 times daily    Demyelinating disease (H)       VIAGRA PO      Take 100 mg by mouth as needed        Vitamin D3 3000 UNITS Tabs      Take 1 tablet by mouth every other day

## 2017-11-21 NOTE — LETTER
11/21/2017       RE: Mychal Stafford  [address] [facility] [address]     Dear Colleague,    Thank you for referring your patient, Mychal Stafford, to the [facility] MULTIPLE SCLEROSIS at [facility]. Please see a copy of my visit note below.    MULTIPLE SCLEROSIS CLINIC AT THE [facility]  FOLLOWUP/ESTABLISHED PATIENT VISIT      PRINCIPAL NEUROLOGIC DIAGNOSIS: Multiple Sclerosis    DISEASE SUMMARY  Date of onset: 2011  Date of diagnosis of MS: 2011  Disease course at onset: Relapsing Remitting  Current disease course: Relapsing Remitting  Previous disease therapies:   Copaxone  Rebif  Current disease therapy: None  Most Recent MRI of the Brain: 11/21/17  Most Recent MRI of the Cervical Cord 11/21/17   Most Recent MRI of the Thoracic Cord: NA  Most Recent Lumbar Puncture: 2011 Positive  Most Recent OCT: NA   Most Recent JCV: 10/18/17 positive  Most Recent Remote Hepatitis Panel: 10/18/17 negative  Most Recent VZV IgG: 10/18/17 positive  Most Recent TB Quant: 10/18/17 negative      CHIEF COMPLAINT: Follow up off DMT      INTERVAL HISTORY:    Overall, unchanged. He denies any new symptoms. He reports resolution of his leg pain    Issues with current MS therapy: Not on DMT    REVIEW OF SYSTEMS:    Mood: unchanged  Spasticity:none  Bladder: none  Bowel: unchanged  Pain related to today's visit:reviewed on nursing intake documentation  Fatigue: unchanged  Sleep: well/ no problem  Memory/Concentration: unchanged    PAST HISTORY was reviewed and updated:      MEDICATIONS and ALLERGIES were reviewed and updated.    SOCIAL HISTORY was reviewed and updated:    EXAM:    PHYSICAL EXAMINATION:   VITAL SIGNS:  B/P: 135/84, T: 98.1, P: 79, R: 20    GENERAL: The patient is a well-nourished  who presents to the evaluation with his father.  NEUROLOGIC:   MENTAL STATUS: Alert,awake and  oriented times four.     GAIT: Gait is   narrow-based and steady and the patient is  able to walk on heels, toes  and in tandem without difficulty.       RESULTS:  Monitoring labs:    Office Visit on 10/18/2017   Component Date Value Ref Range Status     WBC 10/18/2017 7.2  4.0 - 11.0 10e9/L Final     RBC Count 10/18/2017 4.94  4.4 - 5.9 10e12/L Final     Hemoglobin 10/18/2017 14.8  13.3 - 17.7 g/dL Final     Hematocrit 10/18/2017 43.7  40.0 - 53.0 % Final     MCV 10/18/2017 89  78 - 100 fl Final     MCH 10/18/2017 30.0  26.5 - 33.0 pg Final     MCHC 10/18/2017 33.9  31.5 - 36.5 g/dL Final     RDW 10/18/2017 12.5  10.0 - 15.0 % Final     Platelet Count 10/18/2017 260  150 - 450 10e9/L Final     Diff Method 10/18/2017 Automated Method   Final     % Neutrophils 10/18/2017 47.0  % Final     % Lymphocytes 10/18/2017 39.7  % Final     % Monocytes 10/18/2017 8.2  % Final     % Eosinophils 10/18/2017 3.9  % Final     % Basophils 10/18/2017 0.8  % Final     % Immature Granulocytes 10/18/2017 0.4  % Final     Nucleated RBCs 10/18/2017 0  0 /100 Final     Absolute Neutrophil 10/18/2017 3.4  1.6 - 8.3 10e9/L Final     Absolute Lymphocytes 10/18/2017 2.9  0.8 - 5.3 10e9/L Final     Absolute Monocytes 10/18/2017 0.6  0.0 - 1.3 10e9/L Final     Absolute Eosinophils 10/18/2017 0.3  0.0 - 0.7 10e9/L Final     Absolute Basophils 10/18/2017 0.1  0.0 - 0.2 10e9/L Final     Abs Immature Granulocytes 10/18/2017 0.0  0 - 0.4 10e9/L Final     Absolute Nucleated RBC 10/18/2017 0.0   Final     Sodium 10/18/2017 139  133 - 144 mmol/L Final     Potassium 10/18/2017 3.7  3.4 - 5.3 mmol/L Final     Chloride 10/18/2017 106  94 - 109 mmol/L Final     Carbon Dioxide 10/18/2017 25  20 - 32 mmol/L Final     Anion Gap 10/18/2017 8  3 - 14 mmol/L Final     Glucose 10/18/2017 112* 70 - 99 mg/dL Final     Urea Nitrogen 10/18/2017 7  7 - 30 mg/dL Final     Creatinine 10/18/2017 0.70  0.66 - 1.25 mg/dL Final     GFR Estimate 10/18/2017 >90  >60 mL/min/1.7m2 Final     GFR Estimate If Black 10/18/2017 >90  >60 mL/min/1.7m2 Final     Calcium 10/18/2017 8.6  8.5 - 10.1  mg/dL Final     Bilirubin Total 10/18/2017 0.2  0.2 - 1.3 mg/dL Final     Albumin 10/18/2017 4.2  3.4 - 5.0 g/dL Final     Protein Total 10/18/2017 7.7  6.8 - 8.8 g/dL Final     Alkaline Phosphatase 10/18/2017 40  40 - 150 U/L Final     ALT 10/18/2017 23  0 - 70 U/L Final     AST 10/18/2017 11  0 - 45 U/L Final     Folate 10/18/2017 33.6  >5.4 ng/mL Final     Hepatitis B Core Kristine 10/18/2017 Nonreactive  NR^Nonreactive Final     Hepatitis B Surface Antibody 10/18/2017 285.89* <8.00 m[IU]/mL Final     Hep B Surface Agn 10/18/2017 Nonreactive  NR^Nonreactive Final     HCV RNA Quant IU/ml 10/18/2017 HCV RNA Not Detected  HCVND^HCV RNA Not Detected [IU]/mL Final     Log of HCV RNA Qt 10/18/2017 Not Calculated  <1.2 Log IU/mL Final     Lab Scanned Result 10/18/2017 TELMA VIR AB INDEX REFLEX-Scanned*  Final     M Tuberculosis Result 10/18/2017 Negative  NEG^Negative Final     M Tuberculosis Antigen Value 10/18/2017 0.00  IU/mL Final     Lyme Disease Antibodies Serum 10/18/2017 0.05  0.00 - 0.89 Final     Neuromyelitis Optica AQP4 IgG Blood 10/18/2017 <1:10  <1:10 Final     Rheumatoid Factor 10/18/2017 <20  <20 IU/mL Final     TSH 10/18/2017 0.51  0.40 - 4.00 mU/L Final     Varicella Zoster Virus Antibody IgG 10/18/2017 6.5* 0.0 - 0.8 AI Final     Myeloperoxidase Antibody IgG 10/18/2017 <0.2  0.0 - 0.9 AI Final     Proteinase 3 Antibody IgG 10/18/2017 <0.2  0.0 - 0.9 AI Final     Vitamin B12 10/18/2017 517  193 - 986 pg/mL Final     Vitamin B6 10/18/2017 79.2  20.0 - 125.0 nmol/L Final     Vitamin D Deficiency screening 10/18/2017 32  20 - 75 ug/L Final   ]      MRI brain:    MRI Dated 11/21/17:  Mild to moderate  T2 disease burden with 0 enhancion lesion(s).  compared to MRI on 7/11/15 there are 1 new T2 lesion(s).     MRI cervical spine: Unchanged compared to prior MRI date 7/11/15    ASSESSMENT/PLAN:  Patient is a 36-year-old gentleman with past medical history of relapsing remitting multiple sclerosis who is presenting for  follow-up. The patient appears to be clinically stable since last visit. When reviewing his MRI, the patient has one definitive new lesion compared to prior MRI. The patient needs to be on disease modifying therapy at this time. Given that the patient has asthma, patient is not a candidate for Gilenya. The patient also can no longer tolerate injections; therefore, interferons and Copaxone would not benefit this patient. I do not believe the patient requires infusion therapy at this time. Therefore, I feel that Tecfidera would probably be the best medication for the patient at this time.  I explained at length the risk and benefits this medications the patient. The patient agreed to go on this therapy. I will follow up with the patient 3 months after starting this therapy.    Start Tecfidera  Follow up in three months    I spent 25 minutes in this visit, with >50% direct patient time spent counseling about prognosis, treatment options, and coordination of care.    Montez Villafana MD [facility]  Staff Neurologist   11/21/17

## 2017-11-22 NOTE — NURSING NOTE
"Chief Complaint   Patient presents with     RECHECK     [facility]- MULTIPLE SCLEROSIS, 1 MONTH F/U       Initial /84  Pulse 79  Temp 98.1  F (36.7  C) (Oral)  Resp 20  Ht 1.778 m (5' 10\")  Wt 79.3 kg (174 lb 12.8 oz)  SpO2 96%  BMI 25.08 kg/m2 Estimated body mass index is 25.08 kg/(m^2) as calculated from the following:    Height as of this encounter: 1.778 m (5' 10\").    Weight as of this encounter: 79.3 kg (174 lb 12.8 oz).  Medication Reconciliation: complete      Rigo Tran, CMA    "

## 2017-11-22 NOTE — PATIENT INSTRUCTIONS
Will start Tecfidera    Will follow up in 3 months    You saw Dr Montez Villafana, today at the [facility] in [place].  In order to get a message to Dr. Villafana, please call [phone].  Please call if you have any of the following symptoms:    New or worsening neurologic symptoms that persist for 24-48 hours, such as:  o New onset of pain or marked worsening of pain  o Difficulty with speech, swallowing, or breathing  o New onset of vertigo or dizziness  o Change in bowel or bladder function (incontinence, difficulty urinating)    Increasing difficulty in self care    Marked changes in vision (double vision, blurred vision, graying of vision)    Change in mobility    Change in cognitive function    Falling    Worsening numbness, tingling or pain with a change in function    Worsening fatigue lasting more than 2 weeks  If you had labs completed today, we will contact you with the results.  If you are active in [facility], they will be released to you there.  Otherwise, your results will be provided to you via mail or telephone call.  Some results take up to 2 weeks for completion.  If you haven t heard anything about your lab results within 2 weeks, please call [phone] or send a [facility] message to obtain your results.  If you have an MRI scheduled prior to your next appointment with Dr. Villafana, he will provide you with the results at your scheduled follow up appointment.  If you are not scheduled to see Dr. Villafana within 2 weeks after your MRI, please call or send a [facility] message to obtain your results.  Please be aware that it takes at least 5 business days after routine MRIs for your results to be reviewed by both the radiologist and Dr. Villafana.  Prescription refills should be faxed to us by your pharmacy.  Our fax number for prescription refills is [phone].  Please do your best to come to your appointments, and to arrive 10 minutes early to allow time for checking in.   [facility] reserves the right to terminate care of established patients if a patient misses three or more appointments in a clinic without providing notification within a 12-month period.    Developing Your Care Team  Individuals living with chronic illnesses like MS may be unaware that they are at risk for the same range of medical problems as everyone else.  This is why you must establish a relationship with other health care providers in addition to your Multiple Sclerosis doctor.  It can be difficult at times to figure out whether a health concern is related to your MS, or whether it is related to something else, such as hormonal changes, pseduoexacerbations, changes in your core body temperature, flu-like reactions to interferons, exercise, or infections.  Urinary tract infections are common culprits that can cause fatigue, weakness, or other  MS attack -like symptoms without classic symptoms of a UTI.  For this reason, if you call or come in to discuss symptoms, you may be asked to get in touch with your primary provider or another specialist, so that you receive the comprehensive care you need.  What is Multiple Sclerosis (MS)?  MS is a disease in which the nerve tissues in the brain and/or spinal cord are attacked by immune cells in the body.  These immune cells are present in everyone, and their normal role is to fight off infections.  In people with MS, these cells change the way they function and cross into the nervous system.  Once there, they cause inflammation that damages the myelin (or the protective coating of a nerve cell, much like the plastic covering on an electrical cord) and parts of the nerve cell itself.  So far, a clear cause for this immune system dysfunction has not been found.  MS often starts out as the  relapsing-remitting  form.  This means there are episodes when you have symptoms, and other times when you recover to normal or near-normal.  Over time, if the  damage to the nervous system continues, the disease can cause additional disability, such as difficulty walking.  If the relapses and nerve damage can be prevented with available medications, many patients with MS can go many years between relapses and have relatively little disability.  Remember: MS is a condition that changes and must be evaluated on an ongoing basis!  What is a Relapse? (Also called flare-ups, attacks, or exacerbations)  Relapses are due to the occurrence of inflammation in some part of the brain and spinal cord.  A relapse is new or recurrent symptoms which persist for at least 24 hours and sometimes worsen over 48 hours.  New symptoms need to be  by at least 1 month in order to be considered separate relapses. Most of the time, symptoms reach their maximal intensity within 2 weeks and then begin to slowly resolve.  At times, your symptoms may not recover fully for up to 6 months, depending on the severity of the episode.  The frequency of relapses is generally higher early in the disease, but can vary greatly among individuals with MS.  Improvement of symptoms for an individual is unpredictable with each relapse.    It is important to remember than an increase in symptoms and changes in function may not necessarily be a relapse.  There are other factors that contribute to such changes, such as hot weather, increased body temperature, infection, and stress.  The worsening of symptoms may look like a relapse when in reality it is not.  These episodes are referred to as pseudorelapses.  Once the underlying cause is addressed, symptoms usually fade away and you feel better.  If you experience a worsening of symptoms that lasts more than 48 hours and does not improve with cooling down, decreasing stress, or treatment of an infection, please call us and we can help to better determine whether you are having a pseudorelapse versus a relapse.

## 2017-11-22 NOTE — PROGRESS NOTES
MULTIPLE SCLEROSIS CLINIC AT THE [facility]  FOLLOWUP/ESTABLISHED PATIENT VISIT      PRINCIPAL NEUROLOGIC DIAGNOSIS: Multiple Sclerosis    DISEASE SUMMARY  Date of onset: 2011  Date of diagnosis of MS: 2011  Disease course at onset: Relapsing Remitting  Current disease course: Relapsing Remitting  Previous disease therapies:   Copaxone  Rebif  Current disease therapy: None  Most Recent MRI of the Brain: 11/21/17  Most Recent MRI of the Cervical Cord 11/21/17   Most Recent MRI of the Thoracic Cord: NA  Most Recent Lumbar Puncture: 2011 Positive  Most Recent OCT: NA   Most Recent JCV: 10/18/17 positive  Most Recent Remote Hepatitis Panel: 10/18/17 negative  Most Recent VZV IgG: 10/18/17 positive  Most Recent TB Quant: 10/18/17 negative      CHIEF COMPLAINT: Follow up off DMT      INTERVAL HISTORY:    Overall, unchanged. He denies any new symptoms. He reports resolution of his leg pain    Issues with current MS therapy: Not on DMT    REVIEW OF SYSTEMS:    Mood: unchanged  Spasticity:none  Bladder: none  Bowel: unchanged  Pain related to today's visit:reviewed on nursing intake documentation  Fatigue: unchanged  Sleep: well/ no problem  Memory/Concentration: unchanged    PAST HISTORY was reviewed and updated:      MEDICATIONS and ALLERGIES were reviewed and updated.    SOCIAL HISTORY was reviewed and updated:    EXAM:    PHYSICAL EXAMINATION:   VITAL SIGNS:  B/P: 135/84, T: 98.1, P: 79, R: 20    GENERAL: The patient is a well-nourished  who presents to the evaluation with his father.  NEUROLOGIC:   MENTAL STATUS: Alert,awake and  oriented times four.     GAIT: Gait is   narrow-based and steady and the patient is  able to walk on heels, toes and in tandem without difficulty.       RESULTS:  Monitoring labs:    Office Visit on 10/18/2017   Component Date Value Ref Range Status     WBC 10/18/2017 7.2  4.0 - 11.0 10e9/L Final     RBC Count 10/18/2017 4.94  4.4 - 5.9 10e12/L Final     Hemoglobin 10/18/2017 14.8   13.3 - 17.7 g/dL Final     Hematocrit 10/18/2017 43.7  40.0 - 53.0 % Final     MCV 10/18/2017 89  78 - 100 fl Final     MCH 10/18/2017 30.0  26.5 - 33.0 pg Final     MCHC 10/18/2017 33.9  31.5 - 36.5 g/dL Final     RDW 10/18/2017 12.5  10.0 - 15.0 % Final     Platelet Count 10/18/2017 260  150 - 450 10e9/L Final     Diff Method 10/18/2017 Automated Method   Final     % Neutrophils 10/18/2017 47.0  % Final     % Lymphocytes 10/18/2017 39.7  % Final     % Monocytes 10/18/2017 8.2  % Final     % Eosinophils 10/18/2017 3.9  % Final     % Basophils 10/18/2017 0.8  % Final     % Immature Granulocytes 10/18/2017 0.4  % Final     Nucleated RBCs 10/18/2017 0  0 /100 Final     Absolute Neutrophil 10/18/2017 3.4  1.6 - 8.3 10e9/L Final     Absolute Lymphocytes 10/18/2017 2.9  0.8 - 5.3 10e9/L Final     Absolute Monocytes 10/18/2017 0.6  0.0 - 1.3 10e9/L Final     Absolute Eosinophils 10/18/2017 0.3  0.0 - 0.7 10e9/L Final     Absolute Basophils 10/18/2017 0.1  0.0 - 0.2 10e9/L Final     Abs Immature Granulocytes 10/18/2017 0.0  0 - 0.4 10e9/L Final     Absolute Nucleated RBC 10/18/2017 0.0   Final     Sodium 10/18/2017 139  133 - 144 mmol/L Final     Potassium 10/18/2017 3.7  3.4 - 5.3 mmol/L Final     Chloride 10/18/2017 106  94 - 109 mmol/L Final     Carbon Dioxide 10/18/2017 25  20 - 32 mmol/L Final     Anion Gap 10/18/2017 8  3 - 14 mmol/L Final     Glucose 10/18/2017 112* 70 - 99 mg/dL Final     Urea Nitrogen 10/18/2017 7  7 - 30 mg/dL Final     Creatinine 10/18/2017 0.70  0.66 - 1.25 mg/dL Final     GFR Estimate 10/18/2017 >90  >60 mL/min/1.7m2 Final     GFR Estimate If Black 10/18/2017 >90  >60 mL/min/1.7m2 Final     Calcium 10/18/2017 8.6  8.5 - 10.1 mg/dL Final     Bilirubin Total 10/18/2017 0.2  0.2 - 1.3 mg/dL Final     Albumin 10/18/2017 4.2  3.4 - 5.0 g/dL Final     Protein Total 10/18/2017 7.7  6.8 - 8.8 g/dL Final     Alkaline Phosphatase 10/18/2017 40  40 - 150 U/L Final     ALT 10/18/2017 23  0 - 70 U/L Final      AST 10/18/2017 11  0 - 45 U/L Final     Folate 10/18/2017 33.6  >5.4 ng/mL Final     Hepatitis B Core Kristine 10/18/2017 Nonreactive  NR^Nonreactive Final     Hepatitis B Surface Antibody 10/18/2017 285.89* <8.00 m[IU]/mL Final     Hep B Surface Agn 10/18/2017 Nonreactive  NR^Nonreactive Final     HCV RNA Quant IU/ml 10/18/2017 HCV RNA Not Detected  HCVND^HCV RNA Not Detected [IU]/mL Final     Log of HCV RNA Qt 10/18/2017 Not Calculated  <1.2 Log IU/mL Final     Lab Scanned Result 10/18/2017 TELMA VIR AB INDEX REFLEX-Scanned*  Final     M Tuberculosis Result 10/18/2017 Negative  NEG^Negative Final     M Tuberculosis Antigen Value 10/18/2017 0.00  IU/mL Final     Lyme Disease Antibodies Serum 10/18/2017 0.05  0.00 - 0.89 Final     Neuromyelitis Optica AQP4 IgG Blood 10/18/2017 <1:10  <1:10 Final     Rheumatoid Factor 10/18/2017 <20  <20 IU/mL Final     TSH 10/18/2017 0.51  0.40 - 4.00 mU/L Final     Varicella Zoster Virus Antibody IgG 10/18/2017 6.5* 0.0 - 0.8 AI Final     Myeloperoxidase Antibody IgG 10/18/2017 <0.2  0.0 - 0.9 AI Final     Proteinase 3 Antibody IgG 10/18/2017 <0.2  0.0 - 0.9 AI Final     Vitamin B12 10/18/2017 517  193 - 986 pg/mL Final     Vitamin B6 10/18/2017 79.2  20.0 - 125.0 nmol/L Final     Vitamin D Deficiency screening 10/18/2017 32  20 - 75 ug/L Final   ]      MRI brain:    MRI Dated 11/21/17:  Mild to moderate  T2 disease burden with 0 enhancion lesion(s).  compared to MRI on 7/11/15 there are 1 new T2 lesion(s).     MRI cervical spine: Unchanged compared to prior MRI date 7/11/15    ASSESSMENT/PLAN:  Patient is a 36-year-old gentleman with past medical history of relapsing remitting multiple sclerosis who is presenting for follow-up. The patient appears to be clinically stable since last visit. When reviewing his MRI, the patient has one definitive new lesion compared to prior MRI. The patient needs to be on disease modifying therapy at this time. Given that the patient has asthma, patient  is not a candidate for Gilenya. The patient also can no longer tolerate injections; therefore, interferons and Copaxone would not benefit this patient. I do not believe the patient requires infusion therapy at this time. Therefore, I feel that Tecfidera would probably be the best medication for the patient at this time.  I explained at length the risk and benefits this medications the patient. The patient agreed to go on this therapy. I will follow up with the patient 3 months after starting this therapy.    Start Tecfidera  Follow up in three months    I spent 25 minutes in this visit, with >50% direct patient time spent counseling about prognosis, treatment options, and coordination of care.    Montez Villafana MD [facility]  Staff Neurologist   11/21/17

## 2017-11-28 ENCOUNTER — TELEPHONE (OUTPATIENT)
Dept: NEUROLOGY | Facility: CLINIC | Age: 37
End: 2017-11-28

## 2017-11-28 NOTE — TELEPHONE ENCOUNTER
PA Initiation    Medication: Tecfidera 240mg and Titration Starter Pack  Insurance Company: [employer] - Phone [phone] Fax [phone]  Pharmacy Filling the Rx: [facility] [address]  Filling Pharmacy Phone:    Filling Pharmacy Fax:    Start Date: 11/28/2017    Per call to [employer], there is NO PA required.

## 2017-11-28 NOTE — TELEPHONE ENCOUNTER
Prior Authorization Specialty Medication Request    Medication/Dose: Tecfidera 240mg and Titration Starter Pack  Diagnosis and ICD: Relapsing Remitting Multiple Sclerosis, G35  New/Renewal/Insurance Change PA: New    Important Lab Values: n/a    Previously Tried and Failed Therapies: Copaxone and Rebif     Rationale: Initiation of alternate disease modifying therapy for demyelinating disease, please approve.    Would you like to include any research articles? n/a   If yes please include the hyperlink(s) below or fax @ [phone].    (Include Name and MRN)    If you received a fax notification from an outside Pharmacy;  Pharmacy Name: n/a  Pharmacy #: n/a  Pharmacy Fax: n/a

## 2017-11-28 NOTE — TELEPHONE ENCOUNTER
Prior Authorization Approval    Authorization Effective Date: 11/28/2017  Authorization Expiration Date:    Medication: Tecfidera 240mg and Titration Starter Pack APPROVED  Approved Dose/Quantity: 60 PER 30 DAYS  Reference #: n/a   Insurance Company: [employer] - Phone [phone] Fax [phone]  Expected CoPay: n/a     CoPay Card Available:      Foundation Assistance Needed:    Which Pharmacy is filling the prescription (Not needed for infusion/clinic administered): [facility] [address] [place] [address]  Pharmacy Notified: No  Patient Notified: Yes

## 2018-01-15 ENCOUNTER — TELEPHONE (OUTPATIENT)
Dept: NEUROLOGY | Facility: CLINIC | Age: 38
End: 2018-01-15

## 2018-01-15 NOTE — TELEPHONE ENCOUNTER
Answered additional questions via fax from [facility]; faxed back to [phone]

## 2018-01-15 NOTE — TELEPHONE ENCOUNTER
PA Initiation    Medication: Tecfidera 240mg  Insurance Company: [email or web address] - Phone [phone] Fax [phone]  Pharmacy Filling the Rx:  [facility] [email or web address]  Filling Pharmacy Phone:    Filling Pharmacy Fax:    Start Date: 1/15/2018          Will check back later for additional questions on CMM as they are pending.

## 2018-01-15 NOTE — TELEPHONE ENCOUNTER
Prior Authorization Specialty Medication Request    Medication/Dose: Tecfidera 240mg  Diagnosis and ICD: Relapsing Remitting Multiple Sclerosis, G35  New/Renewal/Insurance Change PA: Renewal/Possible insurance change?    Important Lab Values: n/a    Previously Tried and Failed Therapies: Copaxone and Rebif     Rationale: Continuation of current disease modifying therapy for demyelinating disease, currently clinically stable on, please approve.    Would you like to include any research articles? n/a   If yes please include the hyperlink(s) below or fax @ [phone].    (Include Name and MRN)    If you received a fax notification from an outside Pharmacy;  Pharmacy Name: [facility]  Pharmacy #: n/a  Pharmacy Fax: n/a

## 2018-01-17 NOTE — TELEPHONE ENCOUNTER
Prior Authorization Approval    Authorization Effective Date: 1/15/2018  Authorization Expiration Date: 1/15/2020  Medication: Tecfidera 240mg- APPROVED  Approved Dose/Quantity: 60/30 days  Reference #: [id number]   Insurance Company: [employer] [phone] Fax [phone]  Expected CoPay: n/a     Which Pharmacy is filling the prescription (Not needed for infusion/clinic administered):   Pharmacy Notified: No  Patient Notified: No